# Patient Record
Sex: MALE | Race: WHITE | NOT HISPANIC OR LATINO | Employment: OTHER | URBAN - METROPOLITAN AREA
[De-identification: names, ages, dates, MRNs, and addresses within clinical notes are randomized per-mention and may not be internally consistent; named-entity substitution may affect disease eponyms.]

---

## 2017-01-01 ENCOUNTER — TELEPHONE (OUTPATIENT)
Dept: TRANSPLANT | Facility: CLINIC | Age: 67
End: 2017-01-01

## 2017-01-01 ENCOUNTER — PATIENT MESSAGE (OUTPATIENT)
Dept: GASTROENTEROLOGY | Facility: CLINIC | Age: 67
End: 2017-01-01

## 2017-01-01 ENCOUNTER — OFFICE VISIT (OUTPATIENT)
Dept: FAMILY MEDICINE | Facility: CLINIC | Age: 67
End: 2017-01-01
Payer: COMMERCIAL

## 2017-01-01 ENCOUNTER — HOSPITAL ENCOUNTER (OUTPATIENT)
Dept: RADIOLOGY | Facility: HOSPITAL | Age: 67
Discharge: HOME | End: 2017-12-12
Attending: NURSE PRACTITIONER
Payer: COMMERCIAL

## 2017-01-01 ENCOUNTER — TELEPHONE (OUTPATIENT)
Dept: FAMILY MEDICINE | Facility: CLINIC | Age: 67
End: 2017-01-01

## 2017-01-01 VITALS
HEART RATE: 78 BPM | SYSTOLIC BLOOD PRESSURE: 90 MMHG | HEIGHT: 67 IN | WEIGHT: 216.19 LBS | OXYGEN SATURATION: 95 % | BODY MASS INDEX: 33.93 KG/M2 | TEMPERATURE: 98 F | DIASTOLIC BLOOD PRESSURE: 66 MMHG

## 2017-01-01 DIAGNOSIS — I25.10 CORONARY ARTERY DISEASE INVOLVING NATIVE CORONARY ARTERY OF NATIVE HEART WITHOUT ANGINA PECTORIS: ICD-10-CM

## 2017-01-01 DIAGNOSIS — H05.20 EXOPHTHALMOS: ICD-10-CM

## 2017-01-01 DIAGNOSIS — I27.9 CHRONIC PULMONARY HEART DISEASE: ICD-10-CM

## 2017-01-01 DIAGNOSIS — Z99.81 ON HOME O2: ICD-10-CM

## 2017-01-01 DIAGNOSIS — R06.82 TACHYPNEA: ICD-10-CM

## 2017-01-01 DIAGNOSIS — K57.32 DIVERTICULITIS OF LARGE INTESTINE WITHOUT PERFORATION OR ABSCESS WITHOUT BLEEDING: Primary | ICD-10-CM

## 2017-01-01 DIAGNOSIS — J84.112 IPF (IDIOPATHIC PULMONARY FIBROSIS): Primary | ICD-10-CM

## 2017-01-01 DIAGNOSIS — R10.9 ABDOMINAL PAIN, UNSPECIFIED ABDOMINAL LOCATION: ICD-10-CM

## 2017-01-01 DIAGNOSIS — I10 ESSENTIAL HYPERTENSION: ICD-10-CM

## 2017-01-01 DIAGNOSIS — N40.0 ENLARGED PROSTATE: ICD-10-CM

## 2017-01-01 DIAGNOSIS — Z79.899 POLYPHARMACY: Primary | ICD-10-CM

## 2017-01-01 DIAGNOSIS — D53.9 MACROCYTIC ANEMIA: ICD-10-CM

## 2017-01-01 DIAGNOSIS — I27.0 PRIMARY PULMONARY HTN: ICD-10-CM

## 2017-01-01 LAB
ALBUMIN SERPL BCP-MCNC: 3.7 G/DL
ALP SERPL-CCNC: 74 U/L
ALT SERPL W/O P-5'-P-CCNC: 26 U/L
ANION GAP SERPL CALC-SCNC: 9 MMOL/L
AST SERPL-CCNC: 22 U/L
BASOPHILS # BLD AUTO: 0.06 K/UL
BASOPHILS NFR BLD: 0.6 %
BILIRUB SERPL-MCNC: 1 MG/DL
BUN SERPL-MCNC: 15 MG/DL
CALCIUM SERPL-MCNC: 9.5 MG/DL
CHLORIDE SERPL-SCNC: 103 MMOL/L
CO2 SERPL-SCNC: 33 MMOL/L
CREAT SERPL-MCNC: 0.8 MG/DL
DIFFERENTIAL METHOD: ABNORMAL
EOSINOPHIL # BLD AUTO: 0.4 K/UL
EOSINOPHIL NFR BLD: 3.9 %
ERYTHROCYTE [DISTWIDTH] IN BLOOD BY AUTOMATED COUNT: 13.5 %
EST. GFR  (AFRICAN AMERICAN): >60 ML/MIN/1.73 M^2
EST. GFR  (NON AFRICAN AMERICAN): >60 ML/MIN/1.73 M^2
GLUCOSE SERPL-MCNC: 99 MG/DL
HCT VFR BLD AUTO: 43.1 %
HGB BLD-MCNC: 13.7 G/DL
IMM GRANULOCYTES # BLD AUTO: 0.07 K/UL
IMM GRANULOCYTES NFR BLD AUTO: 0.7 %
LYMPHOCYTES # BLD AUTO: 2 K/UL
LYMPHOCYTES NFR BLD: 20.4 %
MCH RBC QN AUTO: 31.4 PG
MCHC RBC AUTO-ENTMCNC: 31.8 G/DL
MCV RBC AUTO: 99 FL
MONOCYTES # BLD AUTO: 0.6 K/UL
MONOCYTES NFR BLD: 6.6 %
NEUTROPHILS # BLD AUTO: 6.5 K/UL
NEUTROPHILS NFR BLD: 67.8 %
NRBC BLD-RTO: 0 /100 WBC
PLATELET # BLD AUTO: 190 K/UL
PMV BLD AUTO: 10.1 FL
POTASSIUM SERPL-SCNC: 4.4 MMOL/L
PROT SERPL-MCNC: 7.3 G/DL
RBC # BLD AUTO: 4.36 M/UL
SODIUM SERPL-SCNC: 145 MMOL/L
T3 SERPL-MCNC: 107 NG/DL
T4 FREE SERPL-MCNC: 0.9 NG/DL
TSH SERPL DL<=0.005 MIU/L-ACNC: 0.95 UIU/ML
WBC # BLD AUTO: 9.65 K/UL

## 2017-01-01 PROCEDURE — 74177 CT ABD & PELVIS W/CONTRAST: CPT | Mod: 26,,, | Performed by: RADIOLOGY

## 2017-01-01 PROCEDURE — 99999 PR PBB SHADOW E&M-EST. PATIENT-LVL V: CPT | Mod: PBBFAC,,, | Performed by: NURSE PRACTITIONER

## 2017-01-01 PROCEDURE — 25500020 PHARM REV CODE 255: Mod: PO | Performed by: NURSE PRACTITIONER

## 2017-01-01 PROCEDURE — 84439 ASSAY OF FREE THYROXINE: CPT

## 2017-01-01 PROCEDURE — 84480 ASSAY TRIIODOTHYRONINE (T3): CPT

## 2017-01-01 PROCEDURE — 85025 COMPLETE CBC W/AUTO DIFF WBC: CPT

## 2017-01-01 PROCEDURE — 74177 CT ABD & PELVIS W/CONTRAST: CPT | Mod: TC,PO

## 2017-01-01 PROCEDURE — 99204 OFFICE O/P NEW MOD 45 MIN: CPT | Mod: S$GLB,,, | Performed by: NURSE PRACTITIONER

## 2017-01-01 PROCEDURE — 84443 ASSAY THYROID STIM HORMONE: CPT

## 2017-01-01 PROCEDURE — 80053 COMPREHEN METABOLIC PANEL: CPT

## 2017-01-01 RX ORDER — BENZONATATE 100 MG/1
CAPSULE ORAL
Refills: 5 | Status: ON HOLD | COMMUNITY
Start: 2017-01-01 | End: 2018-01-01 | Stop reason: HOSPADM

## 2017-01-01 RX ORDER — ASPIRIN 325 MG
325 TABLET ORAL
COMMUNITY
End: 2018-01-01

## 2017-01-01 RX ORDER — SILDENAFIL CITRATE 20 MG/1
40 TABLET ORAL 2 TIMES DAILY
Refills: 4 | COMMUNITY
Start: 2017-01-01

## 2017-01-01 RX ORDER — FUROSEMIDE 40 MG/1
40 TABLET ORAL DAILY
Refills: 5 | Status: ON HOLD | COMMUNITY
Start: 2017-09-09 | End: 2018-01-01 | Stop reason: HOSPADM

## 2017-01-01 RX ORDER — PREDNISONE 10 MG/1
TABLET ORAL
Refills: 5 | Status: ON HOLD | COMMUNITY
Start: 2017-01-01 | End: 2018-01-01 | Stop reason: HOSPADM

## 2017-01-01 RX ORDER — ROSUVASTATIN CALCIUM 10 MG/1
10 TABLET, COATED ORAL DAILY
Refills: 4 | COMMUNITY
Start: 2017-01-01 | End: 2018-01-01

## 2017-01-01 RX ORDER — FINASTERIDE 5 MG/1
5 TABLET, FILM COATED ORAL DAILY
Refills: 5 | Status: ON HOLD | COMMUNITY
Start: 2017-01-01 | End: 2018-01-01 | Stop reason: HOSPADM

## 2017-01-01 RX ORDER — AMOXICILLIN AND CLAVULANATE POTASSIUM 875; 125 MG/1; MG/1
1 TABLET, FILM COATED ORAL 3 TIMES DAILY
Qty: 21 TABLET | Refills: 0 | Status: SHIPPED | OUTPATIENT
Start: 2017-01-01 | End: 2018-01-01

## 2017-01-01 RX ADMIN — IOHEXOL 30 ML: 350 INJECTION, SOLUTION INTRAVENOUS at 09:12

## 2017-01-01 RX ADMIN — IOHEXOL 100 ML: 350 INJECTION, SOLUTION INTRAVENOUS at 09:12

## 2017-01-16 ENCOUNTER — INITIAL CONSULT (OUTPATIENT)
Dept: DERMATOLOGY | Facility: CLINIC | Age: 67
End: 2017-01-16
Payer: COMMERCIAL

## 2017-01-16 VITALS — HEIGHT: 67 IN | RESPIRATION RATE: 22 BRPM | WEIGHT: 225 LBS | BODY MASS INDEX: 35.31 KG/M2

## 2017-01-16 DIAGNOSIS — L40.8 SEBOPSORIASIS: Primary | ICD-10-CM

## 2017-01-16 DIAGNOSIS — L82.1 SEBORRHEIC KERATOSES: ICD-10-CM

## 2017-01-16 DIAGNOSIS — D18.00 ANGIOMA: ICD-10-CM

## 2017-01-16 DIAGNOSIS — D48.5 NEOPLASM OF UNCERTAIN BEHAVIOR OF SKIN: ICD-10-CM

## 2017-01-16 PROCEDURE — 1157F ADVNC CARE PLAN IN RCRD: CPT | Mod: S$GLB,,, | Performed by: DERMATOLOGY

## 2017-01-16 PROCEDURE — 1159F MED LIST DOCD IN RCRD: CPT | Mod: S$GLB,,, | Performed by: DERMATOLOGY

## 2017-01-16 PROCEDURE — 1126F AMNT PAIN NOTED NONE PRSNT: CPT | Mod: S$GLB,,, | Performed by: DERMATOLOGY

## 2017-01-16 PROCEDURE — 88305 TISSUE EXAM BY PATHOLOGIST: CPT | Performed by: PATHOLOGY

## 2017-01-16 PROCEDURE — 1160F RVW MEDS BY RX/DR IN RCRD: CPT | Mod: S$GLB,,, | Performed by: DERMATOLOGY

## 2017-01-16 PROCEDURE — 99999 PR PBB SHADOW E&M-EST. PATIENT-LVL III: CPT | Mod: PBBFAC,,, | Performed by: DERMATOLOGY

## 2017-01-16 PROCEDURE — 11100 PR BIOPSY OF SKIN LESION: CPT | Mod: S$GLB,,, | Performed by: DERMATOLOGY

## 2017-01-16 PROCEDURE — 99214 OFFICE O/P EST MOD 30 MIN: CPT | Mod: 25,S$GLB,, | Performed by: DERMATOLOGY

## 2017-01-16 RX ORDER — NEBIVOLOL HYDROCHLORIDE 2.5 MG/1
TABLET ORAL
Refills: 5 | COMMUNITY
Start: 2016-12-19 | End: 2017-01-01

## 2017-01-16 RX ORDER — FLUOCINONIDE TOPICAL SOLUTION USP, 0.05% 0.5 MG/ML
SOLUTION TOPICAL
Qty: 60 ML | Refills: 3 | Status: SHIPPED | OUTPATIENT
Start: 2017-01-16 | End: 2018-01-01

## 2017-01-16 RX ORDER — FLUCONAZOLE 200 MG/1
TABLET ORAL
Qty: 4 TABLET | Refills: 0 | Status: SHIPPED | OUTPATIENT
Start: 2017-01-16 | End: 2017-01-01

## 2017-01-16 RX ORDER — FLUOCINONIDE 0.5 MG/G
CREAM TOPICAL
Qty: 60 G | Refills: 1 | Status: SHIPPED | OUTPATIENT
Start: 2017-01-16 | End: 2017-08-07 | Stop reason: SDUPTHER

## 2017-01-16 RX ORDER — KETOCONAZOLE 20 MG/ML
SHAMPOO, SUSPENSION TOPICAL
Qty: 240 ML | Refills: 5 | Status: SHIPPED | OUTPATIENT
Start: 2017-01-16 | End: 2018-01-01

## 2017-01-16 RX ORDER — HYDROGEN PEROXIDE 3 %
SOLUTION, NON-ORAL MISCELLANEOUS
Refills: 3 | COMMUNITY
Start: 2016-11-27 | End: 2018-01-01

## 2017-01-16 NOTE — PROGRESS NOTES
"  Subjective:       Patient ID:  Michael Quijano Jr. is a 66 y.o. male who presents for   Chief Complaint   Patient presents with    Rash     HPI Comments: IV- previously followed by Dr Aarti Whitman - last Jan 2016   C/o rash bi-lat legs - began on R thigh approx 4 months ago - Proceeded to lower leg & R posterior leg - dry , scaly & itch , bled in past  Treated w/ OTC antibiotic oint & mupirocin  - no improvement   Lesion R hand approx dry, scaly & flaky , itches - months, bled in past -Treated w/ OTC antibiotic oint & mupirocin        Phx seb derm -most recently using salicylic acid (SALEX) 6 % Sham0 not very helpful. Feels most improved with keto shampoo, has used in past, desires refill. Also desires refill of lidex solution.         Respiratory: Positive for shortness of breath (x 5 years. worsening. recent lung bx c/w idiopathic pulm fibrosis).         Rash     No phx skin cancer    Several scaly areas on back, some dark in color, some "pimple like", asx, no tx. Desires eval.     Review of Systems   Skin: Positive for itching, rash and dry skin. Negative for sensitivity to antibiotic ointment and sensitivity to bandage adhesive.   Hematologic/Lymphatic: Bruises/bleeds easily.        Objective:    Physical Exam   Constitutional: He appears well-developed and well-nourished. No distress.   HENT:   Mouth/Throat: Lips normal.    Eyes: Lids are normal.  No conjunctival no injection.   Cardiovascular: There is no local extremity swelling and no dependent edema.     Neurological: He is alert and oriented to person, place, and time. He is not disoriented.   Psychiatric: He has a normal mood and affect.   Skin:   Areas Examined (abnormalities noted in diagram):   Head / Face Inspection Performed  Neck Inspection Performed  Chest / Axilla Inspection Performed  Abdomen Inspection Performed  Back Inspection Performed  RUE Inspected  LUE Inspection Performed  RLE Inspected  LLE Inspection Performed                  "      Diagram Legend     Erythematous scaling macule/papule c/w actinic keratosis       Vascular papule c/w angioma      Pigmented verrucoid papule/plaque c/w seborrheic keratosis      Yellow umbilicated papule c/w sebaceous hyperplasia      Irregularly shaped tan macule c/w lentigo     1-2 mm smooth white papules consistent with Milia      Movable subcutaneous cyst with punctum c/w epidermal inclusion cyst      Subcutaneous movable cyst c/w pilar cyst      Firm pink to brown papule c/w dermatofibroma      Pedunculated fleshy papule(s) c/w skin tag(s)      Evenly pigmented macule c/w junctional nevus     Mildly variegated pigmented, slightly irregular-bordered macule c/w mildly atypical nevus      Flesh colored to evenly pigmented papule c/w intradermal nevus       Pink pearly papule/plaque c/w basal cell carcinoma      Erythematous hyperkeratotic cursted plaque c/w SCC      Surgical scar with no sign of skin cancer recurrence      Open and closed comedones      Inflammatory papules and pustules      Verrucoid papule consistent consistent with wart     Erythematous eczematous patches and plaques     Dystrophic onycholytic nail with subungual debris c/w onychomycosis     Umbilicated papule    Erythematous-base heme-crusted tan verrucoid plaque consistent with inflamed seborrheic keratosis     Erythematous Silvery Scaling Plaque c/w Psoriasis     See annotation      Assessment / Plan:      Pathology Orders:      Normal Orders This Visit    Tissue Specimen To Pathology, Dermatology     Questions:    Directional Terms:  Other(comment)    Clinical information:  scc vs other    Specific Site:  right hand        Sebopsoriasis  -     ketoconazole (NIZORAL) 2 % shampoo; WASH HAIR WITH SHAMPOO AT LEAST TWICE A WEEK. LET SIT ON SCALP AT LEAST 5 MINUTES PRIOR TO RINSING  Dispense: 240 mL; Refill: 5  -     fluocinonide (LIDEX) 0.05 % external solution; AAA scalp qd - bid prn pruritus  Dispense: 60 mL; Refill: 3  -     fluconazole  (DIFLUCAN) 200 MG Tab; 1 po q week  Dispense: 4 tablet; Refill: 0  -     fluocinonide 0.05% (LIDEX) 0.05 % cream; aaa legs bid prn  Dispense: 60 g; Refill: 1    Neoplasm of uncertain behavior of skin  -     Tissue Specimen To Pathology, Dermatology-If biopsy reveals malignancy, will refer to Dr. Lares for Mohs surgery consultation.    Shave biopsy procedure note:    Shave biopsy performed after verbal consent including risk of infection, scar, recurrence, need for additional treatment of site. Area prepped with alcohol, anesthetized with approximately 1.0cc of 1% lidocaine with epinephrine. Lesional tissue shaved with razor blade. Hemostasis achieved with application of aluminum chloride followed by hyfrecation. No complications. Dressing applied. Wound care explained.        Seborrheic keratoses, trunk  These are benign inherited growths without a malignant potential. Reassurance given to patient. No treatment is necessary.       Angioma, trunk  This is a benign vascular lesion. Reassurance given. No treatment required.                  Return in about 6 months (around 7/16/2017).

## 2017-01-18 ENCOUNTER — TELEPHONE (OUTPATIENT)
Dept: DERMATOLOGY | Facility: CLINIC | Age: 67
End: 2017-01-18

## 2017-01-18 NOTE — TELEPHONE ENCOUNTER
----- Message from Evelyn Chacon MD sent at 1/18/2017  1:37 PM CST -----  Please call with results. Lesion was a thick precancer. Likely removed with biopsy. Can ensure resolved at f/u . Please ensure pt has f/ u with me in the next 2-3 months     FINAL PATHOLOGIC DIAGNOSIS  1. Skin, right hand, shave biopsy:  - HYPERTROPHIC ACTINIC KERATOSIS.  MICROSCOPIC DESCRIPTION: Sections show a hyperplastic epidermis with atypia and maturation disarray within  the lowermost epidermal layers and epidermal hyperplasia. The underlying papillary dermis shows solar elastosis.  Diagnosed by: Vic Sol M.D.  (Electronically Signed: 2017-01-18 13:27:24)  Gross Description  Clinic # [id number]  Received in formalin, labeled right hand, is a 0.7 x 0.6 cm shave biopsy of irregular, crusted, nonpigmented skin.  The specimen is trisected and entirely submitted in a single cassette.  Chas Bansal  Page 1

## 2017-08-07 DIAGNOSIS — L40.8 SEBOPSORIASIS: ICD-10-CM

## 2017-08-08 RX ORDER — FLUOCINOLONE ACETONIDE 0.11 MG/ML
OIL TOPICAL
Qty: 118.28 ML | Refills: 0 | Status: ON HOLD | OUTPATIENT
Start: 2017-08-08 | End: 2018-01-01 | Stop reason: HOSPADM

## 2017-08-08 RX ORDER — FLUOCINONIDE 0.5 MG/G
CREAM TOPICAL
Qty: 60 G | Refills: 1 | Status: SHIPPED | OUTPATIENT
Start: 2017-08-08 | End: 2018-01-01

## 2017-10-27 NOTE — LETTER
10/27/2017    Dandre Siegel  [address]  4TH FLO[address]  [facility] [address]  Phone: [phone]  Fax: [phone]           Dear Dr. Dandre Siegel,     Patient:  Michael Quijano Jr.  MRN:  [id number]  :  1950    Thank you for referring Michael Quijano Jr. to our lung transplant program.  Upon reviewing the medical records provided to our office, we find that Michael Quijano Jr. is not a potential candidate for lung transplantation at [facility] due to his advanced age of 67.  At [facility], our age cutoff for lung transplant consideration is 67.    Michael Quijano Jr. has the option of being referred by your office for lung transplant evaluation at another center.  [facility] may be an option for this patient.  He has been notified of this by phone.    Once again, we appreciate your referral to our center.  We look forward to working with you in the future.  If you have any questions or concerns regarding this decision, then please do not hesitate to contact me at [phone].    Sincerely,         Raheem Friedman MD   Director, Lung Transplantation   Pulmonary & Critical Care Medicine    [facility]  [address] [place] [address] [phone]

## 2017-10-27 NOTE — TELEPHONE ENCOUNTER
Had not previously contacted patient.  Did receive a 5 page fax for referral to lung transplant today.  No clinical information included in fax.  LM destiny Prater with Cardiology at [facility] to find out patient's lung dx.  Contacted patient and notified him that he is not a candidate for lung transplant at [facility] due to his age.  Explained that he may be referred by his Physician to another Center and should contact [facility] to determine if he would be a candidate there.  Pt verbalized understanding.    ----- Message from Lucy Horton sent at 10/27/2017  2:48 PM CDT -----  Contact: PT  Returning Shannon call     Call

## 2017-11-21 NOTE — TELEPHONE ENCOUNTER
----- Message from Mercedes Billingsley sent at 11/21/2017 11:05 AM CST -----  Contact: pt   Pt called because he received a referral from Dr. Emanuel to see Dr. Quiles for a pulmonary right heart cath.  He can be reached @ [phone].  Thanks!!

## 2017-11-21 NOTE — TELEPHONE ENCOUNTER
Spoke with Dr. Siegel's nurse who sent some records to the PH office. Also sent request for records to the medical record department at [facility].

## 2017-12-11 PROBLEM — I27.0 PRIMARY PULMONARY HTN: Status: ACTIVE | Noted: 2017-01-01

## 2017-12-11 PROBLEM — J84.112 IPF (IDIOPATHIC PULMONARY FIBROSIS): Status: ACTIVE | Noted: 2017-01-01

## 2017-12-11 PROBLEM — I10 ESSENTIAL HYPERTENSION: Status: ACTIVE | Noted: 2017-01-01

## 2017-12-11 PROBLEM — Z99.81 ON HOME O2: Status: ACTIVE | Noted: 2017-01-01

## 2017-12-11 PROBLEM — I25.10 CORONARY ARTERY DISEASE INVOLVING NATIVE CORONARY ARTERY OF NATIVE HEART WITHOUT ANGINA PECTORIS: Status: ACTIVE | Noted: 2017-01-01

## 2017-12-11 NOTE — PROGRESS NOTES
Subjective:       Patient ID: Michael Quijano Jr. is a 67 y.o. male.    Chief Complaint: Abdominal Pain (left side) and Constipation    HPI     Patient presents to clinic to establish care.   Hx of IPF: he is established with Dr. Palomares and maintained on home O2. Would like to establish with hospice, as he feels as though his condition is worsening.   CAD and Pulmonary HTN - established with Dr. Siegel, but plans to re-established with Dr. Quiles. Last Angiogram 2016. No current complaints of chest pain, palpitations.   He current complains of left-sided abdominal pain. Sx have been present x 6 months. Sx are intermittent in nature. Last colonoscopy was 12 years ago at [facility]. He has previously seen Dr. Palacios for the same sx. He was told that he is not a candidate for a colonoscopy due to concerns of anesthesia. Stool studies were normal.   He, also, notes accompanying diarrhea alternating with constipation. Feels as though constipation has been present over the past 1-1.5 years.     Exophthalmos - has noted sx over the past year. Feels as though sx have worsened since onset. Notes blurred vision and tearing. No pain.     Review of Systems   Constitutional: Negative for chills and fever.   HENT: Negative for congestion, postnasal drip, rhinorrhea, sinus pain and sinus pressure.    Eyes: Positive for visual disturbance. Negative for discharge and itching.   Respiratory: Positive for shortness of breath. Negative for cough and wheezing.    Cardiovascular: Negative for chest pain and palpitations.   Gastrointestinal: Positive for abdominal pain, constipation and diarrhea. Negative for abdominal distention, blood in stool, nausea and vomiting.   Genitourinary: Positive for frequency. Negative for dysuria, hematuria and urgency.   Skin: Positive for rash. Negative for wound.   Neurological: Positive for light-headedness. Negative for dizziness, syncope (no recent episodes) and headaches.    Psychiatric/Behavioral: Negative for dysphoric mood and sleep disturbance. The patient is nervous/anxious.        Objective:      Physical Exam   Constitutional: He is oriented to person, place, and time. He appears well-developed and well-nourished.   HENT:   Head: Normocephalic and atraumatic.   Cardiovascular: Normal rate, regular rhythm, normal heart sounds and intact distal pulses.    No murmur heard.  Pulmonary/Chest: Effort normal. No apnea and no tachypnea. No respiratory distress. He has no wheezes. He has no rales.   Crackles noted. On home O2   Abdominal: Soft. Bowel sounds are normal. There is tenderness in the left upper quadrant and left lower quadrant. There is no rigidity, no rebound and no guarding.   Musculoskeletal: Normal range of motion. He exhibits edema ( trace ble). He exhibits no tenderness or deformity.   Neurological: He is alert and oriented to person, place, and time. No cranial nerve deficit.   Skin: Skin is warm and dry.   Psychiatric: He has a normal mood and affect. His behavior is normal.   Nursing note and vitals reviewed.      Assessment:       1. IPF (idiopathic pulmonary fibrosis)    2. Primary pulmonary HTN    3. Coronary artery disease involving native coronary artery of native heart without angina pectoris    4. On home O2    5. Essential hypertension    6. Exophthalmos    7. Abdominal pain, unspecified abdominal location        Plan:     Michael was seen today for abdominal pain and constipation.    Diagnoses and all orders for this visit:    IPF (idiopathic pulmonary fibrosis)  -     Ambulatory referral to Hospice  Continue with home O2 and pulmonology f/u.     Primary pulmonary HTN  -     Ambulatory referral to Hospice  Continue with cards f/u.     Coronary artery disease involving native coronary artery of native heart without angina pectoris  -     Ambulatory referral to Hospice  Stable. Continue with cards f/u.     On home O2  -     Ambulatory referral to  Hospice  Pulmonology f/u.     Essential hypertension  Stable. Continue current regimen. Routine f/u.     Exophthalmos  -     Ambulatory referral to Optometry  -     TSH  -     T4, free  -     T3    Abdominal pain, unspecified abdominal location  -     CT Abdomen Pelvis  Without Contrast; Future  -     CBC auto differential; Future  -     Comprehensive metabolic panel; Future  -     Ambulatory referral to Gastroenterology  Update labs and imaging due to duration of sx.   GI follow-up.

## 2017-12-11 NOTE — LETTER
December 11, 2017      Salina Gilbert MD  [address]  [place] [address]           [facility] [address]  [address] [address]  Phone: [phone]  Fax: [phone]          Patient: Michael Quijano Jr.   MR Number: [id number]   YOB: 1950   Date of Visit: 12/11/2017       Dear Dr. Salina Gilbert:    Thank you for referring Michael Quijano to me for evaluation. Attached you will find relevant portions of my assessment and plan of care.    If you have questions, please do not hesitate to call me. I look forward to following Michael Quijano along with you.    Sincerely,    Shu Haines NP    Enclosure  CC:  No Recipients    If you would like to receive this communication electronically, please contact [email or web address] or [phone] to request more information on [employer] access.    For providers and/or their staff who would like to refer a patient to [facility], please contact us through our one-stop-shop provider referral line, [facility] [place], at [phone].    If you feel you have received this communication in error or would no longer like to receive these types of communications, please e-mail [email or web address]

## 2017-12-14 NOTE — LETTER
December 14, 2017    Michael Quijano   [address] [address]             [facility]  [address] [address]  Phone: [phone]  Fax: [phone] Dear Mr. Quijano:    We have been trying to contact you; please contact the office at [phone].      If you have any questions or concerns, please don't hesitate to call.    Sincerely,        Anca Palacios MA

## 2017-12-14 NOTE — TELEPHONE ENCOUNTER
----- Message from Laila Flower sent at 12/14/2017 10:38 AM CST -----  Contact: Patient  Michael, patient [phone] calling for results of CT and labs. Please advise. Thanks.

## 2017-12-14 NOTE — TELEPHONE ENCOUNTER
Received phone call from rhonda with [place]. They have reached out to pt with several phone calls. No response.  Looks like on previous messages pt has called for ct and lab results. Has only seen angie here

## 2017-12-15 NOTE — TELEPHONE ENCOUNTER
----- Message from Lynnette Nieves sent at 12/15/2017 11:02 AM CST -----  Contact: Elvia   Pharmacy called regarding clarification on a Rx received,  amoxicillin-clavulanate 875-125mg (AUGMENTIN) 875-125 mg per tablet. Was submitted for 3 times a day but patient normally takes twice daily. Please contact Elvia DSOU[facility]/pharmacy #[id number] - [place] [phone]  [place] [address]  Phone: [phone] Fax: [phone]

## 2017-12-15 NOTE — TELEPHONE ENCOUNTER
Returned call. Discussed that Up to Date recommends TID dosing for diverticulitis.  Pharmacist's reference recommends BID dosing. Will change to BID dosing.

## 2017-12-15 NOTE — TELEPHONE ENCOUNTER
Reviewed CT results with patient.   Will treat for early diverticulitis.     Patient reports normal PSA 3-4 months ago. Reports hx of enlarged prostate.     Please schedule labs and urology appt.

## 2018-01-01 ENCOUNTER — INITIAL CONSULT (OUTPATIENT)
Dept: TRANSPLANT | Facility: CLINIC | Age: 68
End: 2018-01-01
Payer: COMMERCIAL

## 2018-01-01 ENCOUNTER — HOSPITAL ENCOUNTER (OUTPATIENT)
Dept: PULMONOLOGY | Facility: CLINIC | Age: 68
Discharge: HOME | End: 2018-02-09
Payer: COMMERCIAL

## 2018-01-01 ENCOUNTER — TELEPHONE (OUTPATIENT)
Dept: UROLOGY | Facility: CLINIC | Age: 68
End: 2018-01-01

## 2018-01-01 ENCOUNTER — OFFICE VISIT (OUTPATIENT)
Dept: UROLOGY | Facility: CLINIC | Age: 68
End: 2018-01-01
Payer: COMMERCIAL

## 2018-01-01 ENCOUNTER — LAB VISIT (OUTPATIENT)
Dept: LAB | Facility: HOSPITAL | Age: 68
End: 2018-01-01
Attending: INTERNAL MEDICINE
Payer: COMMERCIAL

## 2018-01-01 ENCOUNTER — NURSE TRIAGE (OUTPATIENT)
Dept: ADMINISTRATIVE | Facility: CLINIC | Age: 68
End: 2018-01-01

## 2018-01-01 VITALS
HEIGHT: 67 IN | HEART RATE: 99 BPM | DIASTOLIC BLOOD PRESSURE: 80 MMHG | WEIGHT: 209.44 LBS | BODY MASS INDEX: 32.87 KG/M2 | SYSTOLIC BLOOD PRESSURE: 138 MMHG

## 2018-01-01 VITALS
OXYGEN SATURATION: 97 % | HEIGHT: 67 IN | WEIGHT: 260 LBS | HEART RATE: 84 BPM | DIASTOLIC BLOOD PRESSURE: 71 MMHG | BODY MASS INDEX: 40.81 KG/M2 | SYSTOLIC BLOOD PRESSURE: 113 MMHG | WEIGHT: 212 LBS | HEIGHT: 67 IN | BODY MASS INDEX: 33.27 KG/M2

## 2018-01-01 DIAGNOSIS — Z87.891 FORMER SMOKER: ICD-10-CM

## 2018-01-01 DIAGNOSIS — I27.0 PRIMARY PULMONARY HTN: Primary | ICD-10-CM

## 2018-01-01 DIAGNOSIS — R35.0 INCREASED URINARY FREQUENCY: ICD-10-CM

## 2018-01-01 DIAGNOSIS — R39.12 WEAK URINE STREAM: ICD-10-CM

## 2018-01-01 DIAGNOSIS — Z79.899 POLYPHARMACY: ICD-10-CM

## 2018-01-01 DIAGNOSIS — R39.15 URINARY URGENCY: ICD-10-CM

## 2018-01-01 DIAGNOSIS — J84.112 IPF (IDIOPATHIC PULMONARY FIBROSIS): ICD-10-CM

## 2018-01-01 DIAGNOSIS — E78.2 MIXED HYPERLIPIDEMIA: ICD-10-CM

## 2018-01-01 DIAGNOSIS — E11.9 TYPE 2 DIABETES MELLITUS WITHOUT COMPLICATION, WITHOUT LONG-TERM CURRENT USE OF INSULIN: ICD-10-CM

## 2018-01-01 DIAGNOSIS — I25.10 CORONARY ARTERY DISEASE INVOLVING NATIVE CORONARY ARTERY OF NATIVE HEART WITHOUT ANGINA PECTORIS: ICD-10-CM

## 2018-01-01 DIAGNOSIS — N13.8 ENLARGED PROSTATE WITH URINARY OBSTRUCTION: Primary | ICD-10-CM

## 2018-01-01 DIAGNOSIS — J96.11 CHRONIC RESPIRATORY FAILURE WITH HYPOXIA: ICD-10-CM

## 2018-01-01 DIAGNOSIS — I27.9 CHRONIC PULMONARY HEART DISEASE: ICD-10-CM

## 2018-01-01 DIAGNOSIS — N41.0 PROSTATITIS, ACUTE: ICD-10-CM

## 2018-01-01 DIAGNOSIS — Z80.42 FAMILY HISTORY OF PROSTATE CANCER: ICD-10-CM

## 2018-01-01 DIAGNOSIS — I10 ESSENTIAL HYPERTENSION: ICD-10-CM

## 2018-01-01 DIAGNOSIS — R06.82 TACHYPNEA: ICD-10-CM

## 2018-01-01 DIAGNOSIS — J84.112 UIP (USUAL INTERSTITIAL PNEUMONITIS): ICD-10-CM

## 2018-01-01 DIAGNOSIS — N40.0 BENIGN PROSTATIC HYPERPLASIA, UNSPECIFIED WHETHER LOWER URINARY TRACT SYMPTOMS PRESENT: Primary | ICD-10-CM

## 2018-01-01 DIAGNOSIS — N40.1 ENLARGED PROSTATE WITH URINARY OBSTRUCTION: Primary | ICD-10-CM

## 2018-01-01 LAB
ALBUMIN SERPL BCP-MCNC: 3.4 G/DL
ALP SERPL-CCNC: 67 U/L
ALT SERPL W/O P-5'-P-CCNC: 26 U/L
ANION GAP SERPL CALC-SCNC: 10 MMOL/L
AST SERPL-CCNC: 21 U/L
BASOPHILS # BLD AUTO: 0.03 K/UL
BASOPHILS NFR BLD: 0.2 %
BILIRUB SERPL-MCNC: 1 MG/DL
BILIRUB SERPL-MCNC: NORMAL MG/DL
BLOOD URINE, POC: NORMAL
BNP SERPL-MCNC: 68 PG/ML
BUN SERPL-MCNC: 15 MG/DL
CALCIUM SERPL-MCNC: 9.1 MG/DL
CHLORIDE SERPL-SCNC: 98 MMOL/L
CO2 SERPL-SCNC: 31 MMOL/L
COLOR, POC UA: YELLOW
CREAT SERPL-MCNC: 0.9 MG/DL
DIFFERENTIAL METHOD: ABNORMAL
EOSINOPHIL # BLD AUTO: 0.4 K/UL
EOSINOPHIL NFR BLD: 2.7 %
ERYTHROCYTE [DISTWIDTH] IN BLOOD BY AUTOMATED COUNT: 12.8 %
EST. GFR  (AFRICAN AMERICAN): >60 ML/MIN/1.73 M^2
EST. GFR  (NON AFRICAN AMERICAN): >60 ML/MIN/1.73 M^2
GLUCOSE SERPL-MCNC: 120 MG/DL
GLUCOSE UR QL STRIP: NORMAL
HCT VFR BLD AUTO: 43.2 %
HGB BLD-MCNC: 14 G/DL
IMM GRANULOCYTES # BLD AUTO: 0.08 K/UL
IMM GRANULOCYTES NFR BLD AUTO: 0.6 %
KETONES UR QL STRIP: NORMAL
LEUKOCYTE ESTERASE URINE, POC: 5
LYMPHOCYTES # BLD AUTO: 2.2 K/UL
LYMPHOCYTES NFR BLD: 16 %
MAGNESIUM SERPL-MCNC: 2 MG/DL
MCH RBC QN AUTO: 31.7 PG
MCHC RBC AUTO-ENTMCNC: 32.4 G/DL
MCV RBC AUTO: 98 FL
MONOCYTES # BLD AUTO: 0.7 K/UL
MONOCYTES NFR BLD: 4.9 %
NEUTROPHILS # BLD AUTO: 10.3 K/UL
NEUTROPHILS NFR BLD: 75.6 %
NITRITE, POC UA: NORMAL
NRBC BLD-RTO: 0 /100 WBC
PH, POC UA: 0.02
PLATELET # BLD AUTO: 208 K/UL
PMV BLD AUTO: 10 FL
POTASSIUM SERPL-SCNC: 4.1 MMOL/L
PROT SERPL-MCNC: 7.3 G/DL
PROTEIN, POC: NORMAL
RBC # BLD AUTO: 4.41 M/UL
SODIUM SERPL-SCNC: 139 MMOL/L
SPECIFIC GRAVITY, POC UA: 1
UROBILINOGEN, POC UA: 0.2
WBC # BLD AUTO: 13.57 K/UL

## 2018-01-01 PROCEDURE — 3008F BODY MASS INDEX DOCD: CPT | Mod: S$GLB,,, | Performed by: INTERNAL MEDICINE

## 2018-01-01 PROCEDURE — 99999 PR PBB SHADOW E&M-EST. PATIENT-LVL III: CPT | Mod: PBBFAC,,, | Performed by: INTERNAL MEDICINE

## 2018-01-01 PROCEDURE — 3075F SYST BP GE 130 - 139MM HG: CPT | Mod: CPTII,S$GLB,, | Performed by: UROLOGY

## 2018-01-01 PROCEDURE — 94618 PULMONARY STRESS TESTING: CPT | Mod: S$GLB,,, | Performed by: INTERNAL MEDICINE

## 2018-01-01 PROCEDURE — 81002 URINALYSIS NONAUTO W/O SCOPE: CPT | Mod: S$GLB,,, | Performed by: UROLOGY

## 2018-01-01 PROCEDURE — 1159F MED LIST DOCD IN RCRD: CPT | Mod: S$GLB,,, | Performed by: INTERNAL MEDICINE

## 2018-01-01 PROCEDURE — 83880 ASSAY OF NATRIURETIC PEPTIDE: CPT

## 2018-01-01 PROCEDURE — 83735 ASSAY OF MAGNESIUM: CPT

## 2018-01-01 PROCEDURE — 99205 OFFICE O/P NEW HI 60 MIN: CPT | Mod: S$GLB,,, | Performed by: INTERNAL MEDICINE

## 2018-01-01 PROCEDURE — 80053 COMPREHEN METABOLIC PANEL: CPT

## 2018-01-01 PROCEDURE — 36415 COLL VENOUS BLD VENIPUNCTURE: CPT | Mod: PO

## 2018-01-01 PROCEDURE — 85025 COMPLETE CBC W/AUTO DIFF WBC: CPT

## 2018-01-01 PROCEDURE — 3079F DIAST BP 80-89 MM HG: CPT | Mod: CPTII,S$GLB,, | Performed by: UROLOGY

## 2018-01-01 PROCEDURE — 99999 PR PBB SHADOW E&M-EST. PATIENT-LVL III: CPT | Mod: PBBFAC,,, | Performed by: UROLOGY

## 2018-01-01 PROCEDURE — 99204 OFFICE O/P NEW MOD 45 MIN: CPT | Mod: 25,S$GLB,, | Performed by: UROLOGY

## 2018-01-01 RX ORDER — HYDROGEN PEROXIDE 3 %
SOLUTION, NON-ORAL MISCELLANEOUS
COMMUNITY
End: 2018-01-01

## 2018-01-01 RX ORDER — TAMSULOSIN HYDROCHLORIDE 0.4 MG/1
0.4 CAPSULE ORAL DAILY
Qty: 30 CAPSULE | Refills: 11 | Status: SHIPPED | OUTPATIENT
Start: 2018-01-01 | End: 2018-01-01 | Stop reason: SDUPTHER

## 2018-01-01 RX ORDER — GUAIFENESIN 1200 MG
TABLET, EXTENDED RELEASE 12 HR ORAL
COMMUNITY
End: 2018-01-01

## 2018-01-01 RX ORDER — TAMSULOSIN HYDROCHLORIDE 0.4 MG/1
0.4 CAPSULE ORAL DAILY
Qty: 30 CAPSULE | Refills: 11 | Status: SHIPPED | OUTPATIENT
Start: 2018-01-01 | End: 2019-06-04

## 2018-01-01 RX ORDER — MORPHINE SULFATE 15 MG/1
15 TABLET ORAL EVERY 4 HOURS PRN
Status: ON HOLD | COMMUNITY
End: 2018-01-01 | Stop reason: HOSPADM

## 2018-01-01 RX ORDER — CIPROFLOXACIN 500 MG/1
500 TABLET ORAL 2 TIMES DAILY
Qty: 28 TABLET | Refills: 0 | Status: SHIPPED | OUTPATIENT
Start: 2018-01-01 | End: 2018-01-01

## 2018-01-01 RX ORDER — HYDROCODONE BITARTRATE AND ACETAMINOPHEN 10; 325 MG/1; MG/1
1 TABLET ORAL EVERY 4 HOURS PRN
Refills: 0 | COMMUNITY
Start: 2017-01-01 | End: 2018-01-01

## 2018-02-06 PROBLEM — J96.11 CHRONIC RESPIRATORY FAILURE WITH HYPOXIA: Status: ACTIVE | Noted: 2018-01-01

## 2018-02-06 NOTE — PROGRESS NOTES
Subjective:    Patient ID:  Michael Quijano Jr. is a 67 y.o. male who presents for evaluation of Pulmonary Hypertension.    HPI  68 yo man with UIP/ILD, former smoker, with HTN, DL, DM, referred by Dr Siegel for eval and management of PH    Pt was dx'd by Dr Plaomares at [facility] in 12/15 with IPF. 6 mo later Dr Campos did a RHC to confirm that he had PH. He has been trying to treat him, unfortunately the two treatments for IPF are not options for him (SE from one and the other caused stroke)  He was started on revatio 40mg bid which he cant say helped his breathing- opsummit was added and he doesn't know that that has helped either. He is SOB sitting in his wheelchair talking to me. Walking across the room makes  Him so SOB it wears him out.  He says they put him on hospice. They have suggested he take morphine which he is taking MSContin with hydrocodone for breaththrough, this seems to help his breathing, but hes so fatigued and taxed he veg's.    He does not retain much fluid- he does take 40mg lasix when he has some swelling in his ankles and in a day or two it subsides  He does get some CP which has been fairly recent, as well as palpitations/fluttering- feels his heart race for a while, mostly in am     SH: no tobacco, did smoke MJ    FH: no lung disease, GM had COPD, +FH HTN  mother  cerebral aneurism    Six Minute Walk Test:   158  m (   m in    )                                              O2 sat  98 ->83  %                                                           HR 83  -> 94                                                                 BP  141 / 83  ->139 /84                                                         Nadya   0.5  -> 5-6      Lung Bx  UIP with honeycombing    Echo        LV normal  RV mildly dilated, mod reduced syst fxn  Inadequate TR jet    DSE 16  No WMA, dilated RV with RV dysfunction    EKG   /    /  n/a    RHC   /      /  Done at [facility] but n/a    CXR   /     "/  n/a    CT Chest   12 / 17 ( I viewed images with pt and his Wife)       Groundglass changes noted in the lung bases bilaterally along with bronchiectasis and air bronchograms. This could relate to a progressive process such as a UIP, DIP or idiopathic nonspecific interstitial pneumonia. A pneumonia or pneumonitis could appear similar. This is detrimental he changed since 6/4/17.      PFTs    2 /17, part of 6mw- 700 ft  desat to 94%, with 2L 91%    FVC   1.76 L  44    % pred  FEV1   1.5 L51 % pred  FEV1/FVC  86   %  TLC  n/a  DLCO   39     % pred     V/Q Scan  /    /  n/a    Review of Systems   Constitution: Positive for decreased appetite, weakness and malaise/fatigue. Negative for chills, fever and weight gain.   HENT: Positive for congestion.    Eyes: Negative.    Cardiovascular: Positive for dyspnea on exertion, irregular heartbeat, leg swelling and palpitations. Negative for chest pain, near-syncope, orthopnea, paroxysmal nocturnal dyspnea and syncope.   Respiratory: Positive for cough, shortness of breath, sleep disturbances due to breathing and sputum production.    Endocrine: Negative.    Skin: Negative.    Musculoskeletal: Positive for muscle weakness.   Gastrointestinal: Positive for constipation and diarrhea. Negative for bloating, abdominal pain and change in bowel habit.   Neurological: Negative for dizziness and light-headedness.   Psychiatric/Behavioral: Negative for depression.        Objective:  /71   Pulse 84   Ht 5' 7" (1.702 m)   Wt 117.9 kg (260 lb)   SpO2 97% Comment: Pt on 4L of O2 at reading  BMI 40.72 kg/m²       Physical Exam   Constitutional: He is oriented to person, place, and time. He appears well-developed and well-nourished.   Older chornically ill appearing man in wheelchair   HENT:   Head: Normocephalic and atraumatic.   Eyes: Right eye exhibits no discharge. Left eye exhibits no discharge.   Neck: Neck supple. No JVD present. No thyromegaly present.   Cardiovascular: " Normal rate and regular rhythm.  Exam reveals no gallop and no friction rub.    No murmur heard.       Pulmonary/Chest: No respiratory distress. He has no wheezes. He has no rales.   Decreased BS throughout, pursed lip breathing   Abdominal: Soft. Bowel sounds are normal. He exhibits no distension. There is no tenderness.   Musculoskeletal: Normal range of motion. He exhibits edema. He exhibits no tenderness.   Neurological: He is alert and oriented to person, place, and time. No cranial nerve deficit. Coordination normal.   Skin: Skin is warm and dry. No rash noted.   Psychiatric: He has a normal mood and affect. Judgment and thought content normal.           Chemistry        Component Value Date/Time     02/08/2018 0803    K 4.1 02/08/2018 0803    CL 98 02/08/2018 0803    CO2 31 (H) 02/08/2018 0803    BUN 15 02/08/2018 0803    CREATININE 0.9 02/08/2018 0803     (H) 02/08/2018 0803        Component Value Date/Time    CALCIUM 9.1 02/08/2018 0803    ALKPHOS 67 02/08/2018 0803    AST 21 02/08/2018 0803    ALT 26 02/08/2018 0803    BILITOT 1.0 02/08/2018 0803    ESTGFRAFRICA >60.0 02/08/2018 0803    EGFRNONAA >60.0 02/08/2018 0803            Magnesium   Date Value Ref Range Status   02/08/2018 2.0 1.6 - 2.6 mg/dL Final       Lab Results   Component Value Date    WBC 13.57 (H) 02/08/2018    HGB 14.0 02/08/2018    HCT 43.2 02/08/2018    MCV 98 02/08/2018     02/08/2018       No results found for: INR, PROTIME    BNP   Date Value Ref Range Status   02/08/2018 68 0 - 99 pg/mL Final     Comment:     Values of less than 100 pg/ml are consistent with non-CHF populations.       No results found for: LDH          Assessment:       1. Secondary pulmonary HTN in the setting of severe IPF- advanced parenchymal lung disease with chronic resp failure, now on hospice which is appropriate   2. IPF (idiopathic pulmonary fibrosis)    3. Essential hypertension    4. Coronary artery disease involving native coronary  artery of native heart without angina pectoris    5. Chronic respiratory failure with hypoxia    6. Former smoker (MJ)   7. Type 2 diabetes mellitus without complication, without long-term current use of insulin    8. Mixed hyperlipidemia    9. UIP (usual interstitial pneumonitis)      WHO Group:1  Functional Class 4     Plan:       Unfortunately current evidence-based guidelines do not recommend the use of pulmonary vasodilators in patients with PH in the setting of chronic lung disease. These medications have not only be shown to have no clinical benefit, but may in fact worsen the clinical picture by causing increased V/Q mismatch/shunting . The focus of treatment going forward should be on optimizing his QOL as much as possible.  To this end, I am discontinuing his opsummit given that it is leading to SE without any clinical benefit. Could cont revatio as it is relatively benign and MAY be helping his breathing somewhat.   Ok to stop the statin, diovan, ASA given that he is at end of life    Explained why remaining on morphine is would be prudent to help ease his SOB, and encouraged him to use his breath as his gauge for his activity level. Pt understands his overall Px and that unfortunately there is nothing that I can offer him froma PH standpoint. We had a owen discussion today over approximately 1 hour about end of life and goals of care.     Further care will be delivered by pt's hospice program

## 2018-02-08 PROBLEM — E78.2 MIXED HYPERLIPIDEMIA: Status: ACTIVE | Noted: 2018-01-01

## 2018-02-08 PROBLEM — E11.9 TYPE 2 DIABETES MELLITUS WITHOUT COMPLICATION, WITHOUT LONG-TERM CURRENT USE OF INSULIN: Status: ACTIVE | Noted: 2018-01-01

## 2018-02-08 PROBLEM — Z87.891 FORMER SMOKER: Status: ACTIVE | Noted: 2018-01-01

## 2018-02-08 PROBLEM — J84.112 UIP (USUAL INTERSTITIAL PNEUMONITIS): Status: ACTIVE | Noted: 2018-01-01

## 2018-02-09 NOTE — PROCEDURES
Michael Quijano Jr. is a 67 y.o.  male patient, who presents for a 6 minute walk test ordered by Carmencita Quiles MD.  The diagnosis is Qualify for Oxygen; Pulmonary Fibrosis; Pulmonary Hypertension.  The patient's BMI is 33.3 kg/m2.  Predicted distance (lower limit of normal) is 321.33 meters.      Test Results:    The test was completed without stopping.  The total time walked was 360 seconds.  During walking, the patient reported:  Chest pain, Dyspnea, Lightheadedness.  The patient used a wheelchair for assistance and supplemental oxygen during testing.     02/09/2018---------Distance: 158.5 meters (520 feet)     O2 Sat % Supplemental Oxygen Heart Rate Blood Pressure Nadya Scale   Pre-exercise  (Resting) 98 % 6 L/M 83 bpm 141/83 mmHg 0.5   During Exercise 83 % 6 L/M 94 bpm 139/84 mmHg 5-6   Post-exercise  (Recovery) 95 % 6 L/M  84 bpm       Recovery Time:  104 seconds    Performing nurse/tech:  MOI Ellison RRT      PREVIOUS STUDY:   The patient has not had a previous study.      CLINICAL INTERPRETATION:  Six minute walk distance is 158.5 meters (520 feet) with heavy dyspnea.  During exercise, there was significant desaturation while breathing supplemental oxygen.  Both blood pressure and heart rate remained stable with walking.  The patient reported non-pulmonary symptoms during exercise.  Significant exercise impairment is likely due to desaturation and subjective symptoms.  The patient did complete the study, walking 360 seconds of the 360 second test.  No previous study performed.  Based upon age and body mass index, exercise capacity is less than predicted.
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
## 2018-06-03 NOTE — TELEPHONE ENCOUNTER
Patient called to report the following:    -patient has recently seen urologist, did u/a 5/10/18  -I am calling b/c 2-3 days of severe pains in upper right side of back under  rib cage   -hx of kidneys stones   -pain tightens down and is ayana   -pain is similar to when I had kidney stones  -sweating, passed out last Thursday     Education completed per [facility] On Call Care Advice including reporting to ED for evaluation.   Patient/ Caregiver verbalized understanding.          Reason for Disposition   [1] SEVERE pain (e.g., excruciating, scale 8-10) AND [2] present > 1 hour    Protocols used: ST FLANK PAIN-A-AH

## 2018-06-04 PROBLEM — J18.9 PNEUMONIA: Status: ACTIVE | Noted: 2018-01-01

## 2018-06-04 PROBLEM — R52 INTRACTABLE PAIN: Status: ACTIVE | Noted: 2018-01-01

## 2018-06-04 PROBLEM — R07.89 CHEST WALL PAIN: Status: ACTIVE | Noted: 2018-01-01

## 2018-06-04 NOTE — TELEPHONE ENCOUNTER
Spoke to pt and he is still weak, believes he is running a fever. Pt is going to go to the ER at [facility]. Pt has hx of kidney stones. Pt has pneumonia dx on 5/30/18. Still having a hard time breathing pt states. Pt has had a BM. Pt will call if he needs any future assistance.

## 2018-06-04 NOTE — TELEPHONE ENCOUNTER
----- Message from Hector Schafer sent at 6/4/2018  8:20 AM CDT -----  Contact: pt  Pt is requesting a callback from nurse( went to ask pt reason for callback and phone got disconnected)was not able to get reason for callback, pt did stated had to speak with on call over the weekend  Call Back#[phone]   Thanks

## 2018-06-09 PROBLEM — I48.91 ATRIAL FIBRILLATION: Status: ACTIVE | Noted: 2018-01-01

## 2018-06-09 PROBLEM — M48.50XA VERTEBRAL COMPRESSION FRACTURE: Status: ACTIVE | Noted: 2018-01-01

## 2018-06-12 PROBLEM — R10.9 ABDOMINAL PAIN: Status: ACTIVE | Noted: 2018-01-01

## 2018-06-12 PROBLEM — R06.02 SOB (SHORTNESS OF BREATH): Status: ACTIVE | Noted: 2018-01-01

## 2018-06-26 PROBLEM — I48.91 ATRIAL FIBRILLATION WITH RVR: Status: ACTIVE | Noted: 2018-01-01

## 2018-06-30 PROBLEM — J96.21 ACUTE ON CHRONIC RESPIRATORY FAILURE WITH HYPOXEMIA: Status: ACTIVE | Noted: 2018-01-01

## 2018-07-12 PROBLEM — Z51.5 COMFORT MEASURES ONLY STATUS: Status: ACTIVE | Noted: 2018-01-01
